# Patient Record
Sex: MALE | Race: WHITE | NOT HISPANIC OR LATINO | Employment: OTHER | URBAN - NONMETROPOLITAN AREA
[De-identification: names, ages, dates, MRNs, and addresses within clinical notes are randomized per-mention and may not be internally consistent; named-entity substitution may affect disease eponyms.]

---

## 2017-01-03 ENCOUNTER — TELEPHONE (OUTPATIENT)
Dept: UROLOGY | Facility: CLINIC | Age: 72
End: 2017-01-03

## 2017-01-03 NOTE — TELEPHONE ENCOUNTER
01/03/2017 11:53 AM Phone (Outgoing) DAVID [phone]        CONTACT DAVID [facility]UNA PRECERT FOR SURGERY BACK IN 10/2015. AUTH # [id number]. WILL SCAN INTO MEDIA        By Gian Asher

## 2017-01-03 NOTE — TELEPHONE ENCOUNTER
----- Message from Radha Liu sent at 1/3/2017 10:16 AM CST -----  Contact: DAVID [phone]  SEE MESSAGE BELOW  ----- Message -----     From: Pari Davison     Sent: 1/3/2017  10:04 AM       To: Radha Liu    PLEASE CALL DAVID IN PRECERT WITH [facility] CONCERNING PRECERT ON [facility] FROM 10/28/15.    THANKS